# Patient Record
Sex: MALE | Race: WHITE | NOT HISPANIC OR LATINO | Employment: STUDENT | URBAN - METROPOLITAN AREA
[De-identification: names, ages, dates, MRNs, and addresses within clinical notes are randomized per-mention and may not be internally consistent; named-entity substitution may affect disease eponyms.]

---
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
## 2023-09-30 ENCOUNTER — APPOINTMENT (OUTPATIENT)
Dept: RADIOLOGY | Facility: CLINIC | Age: 23
End: 2023-09-30
Payer: COMMERCIAL

## 2023-09-30 ENCOUNTER — HOSPITAL ENCOUNTER (EMERGENCY)
Facility: CLINIC | Age: 23
Discharge: HOME | End: 2023-09-30
Payer: COMMERCIAL

## 2023-09-30 VITALS
HEIGHT: 71 IN | RESPIRATION RATE: 16 BRPM | TEMPERATURE: 99.2 F | BODY MASS INDEX: 29.4 KG/M2 | WEIGHT: 210 LBS | DIASTOLIC BLOOD PRESSURE: 76 MMHG | OXYGEN SATURATION: 98 % | SYSTOLIC BLOOD PRESSURE: 119 MMHG | HEART RATE: 80 BPM

## 2023-09-30 DIAGNOSIS — S90.129A CONTUSION OF TOE: ICD-10-CM

## 2023-09-30 DIAGNOSIS — S99.921A INJURY OF TOE ON RIGHT FOOT, INITIAL ENCOUNTER: ICD-10-CM

## 2023-09-30 DIAGNOSIS — M79.674 PAIN IN TOE OF RIGHT FOOT: ICD-10-CM

## 2023-09-30 PROCEDURE — 73630 X-RAY EXAM OF FOOT: CPT | Mod: RT

## 2023-09-30 PROCEDURE — 99284 EMERGENCY DEPT VISIT MOD MDM: CPT | Mod: 25

## 2023-09-30 ASSESSMENT — ACTIVITIES OF DAILY LIVING (ADL): ADLS_ACUITY_SCORE: 35

## 2023-10-01 NOTE — ED PROVIDER NOTES
EMERGENCY DEPARTMENT ENCOUNTER      NAME: Rashad Osuna  AGE: 22 year old male  YOB: 2000  MRN: [id number]  EVALUATION DATE & TIME: 9/30/2023  8:30 PM    PCP: Rhonda Stern    ED PROVIDER: Francesca Stapleton PA-C    Chief Complaint   Patient presents with    Toe Injury     FINAL IMPRESSION:  1. Pain in toe of right foot    2. Injury of toe on right foot, initial encounter    3. Contusion of toe      MEDICAL DECISION MAKING:    Pertinent Labs & Imaging studies reviewed. (See chart for details)  Rashad Osuna is a 22 year old male who presents for evaluation of right toe pain. Patient reports he accidentally twisted his right 5th toe during karate earlier today around 4pm. Started having worsening pain around 7pm this evening after walking on it for a few hours. Describes pain as throbbing, worse with ambulation. No paresthesias. He denies falling or any other injuries. He has not iced his toe or tried any pain medications prior to arrival. Denies any other concerns..     On my initial evaluation, vital signs normal. On physical exam patient is awake, alert, no acute distress, resting comfortably in bed.  Does not appear uncomfortable, ill or toxic.  On inspection of his right foot, he does have ecchymosis and mild swelling to his right fifth toe dorsally.  No abrasion, laceration or skin wound.  Mild tenderness to palpation over this area, but no significant bony tenderness.  Full range of motion of toe.  Able to wiggle all toes appropriately.  No other bony tenderness on palpation to tarsal and metatarsals.  Patient able to bear weight with an antalgic gait.  Normal strength and sensation to right foot..    Differential diagnosis includes muscle strain, sprain, contusion, hematoma, fracture, dislocation, tendon or ligamentous injury,. Emergency department workup included x-ray of right toes. Patient declined wanting medication for pain.    X-ray negative for fracture or dislocation.  Suspect  this is a toe sprain versus bony contusion.  Discussed RICE with patient and offered su taping and Ortho shoe, for which patient accepted both of these.  Encouraged patient to take Tylenol and ibuprofen for the pain.  Otherwise reassuring exam and work-up today, low suspicion for any emergent process that require further ER intervention or hospital admission.  Provided expectant management as well as strict return precautions.    Patient has had serial examinations and notes significant improvement.     Patient was discharged in stable condition with treatment plan as below. Instructed to follow up with primary care provider in 3 days and return to the emergency department with any new or worsening of symptoms. Patient expressed understanding, feels comfortable, and is in agreement with this plan. All questions addressed prior to discharge.    Medical Decision Making    History:  Supplemental history from: Documented in chart, if applicable  External Record(s) reviewed: Documented in chart, if applicable.    Work Up:  Chart documentation includes differential considered and any EKGs or imaging independently interpreted by provider, where specified.  In additional to work up documented, I considered the following work up: Documented in chart, if applicable.    External consultation:  Discussion of management with another provider: Documented in chart, if applicable    Complicating factors:  Care impacted by chronic illness: N/A  Care affected by social determinants of health: N/A    Disposition considerations: Discharge. No recommendations on prescription strength medication(s). See documentation for any additional details.    ED COURSE:  8:38 PM  I reviewed the patient's chart. I met with the patient to gather history and to perform my initial exam. Updated patient and parents on results. We discussed plan for discharge including treatment plan, follow-up and return precautions to emergency department.  Patient  voiced understanding and in agreement with this plan.    At the conclusion of the encounter I discussed the results of all of the tests and the disposition. The questions were answered. The patient or family acknowledged understanding and was agreeable with the care plan.     Voice recognition software was used in the creation of this note. Any grammatical or nonsensical errors are due to inherent errors with the software and are not the intention of the writer.     MEDICATIONS GIVEN IN THE EMERGENCY:  Medications - No data to display    NEW PRESCRIPTIONS STARTED AT TODAY'S ER VISIT  Discharge Medication List as of 9/30/2023  9:37 PM        =================================================================    HPI:    Patient information was obtained from: patient    Use of Interpretor: N/A    Rashad MARLA Osuna is a 22 year old male without a pertinent history who presents to this ED by walk in for evaluation of right 5th toe pain. Patient reports he accidentally twisted his right 5th toe during karate earlier today around 4pm. Started having worsening pain around 7pm this evening after walking on it for a few hours. Describes pain as throbbing, worse with ambulation. No paresthesias. He denies falling or any other injuries. He has not iced his toe or tried any pain medications prior to arrival. Denies any other concerns.    REVIEW OF SYSTEMS:  Review of Systems   Musculoskeletal:         +R 5th toe pain     PAST MEDICAL HISTORY:  History reviewed. No pertinent past medical history.    PAST SURGICAL HISTORY:  History reviewed. No pertinent surgical history.    CURRENT MEDICATIONS:    No current facility-administered medications for this encounter.    Current Outpatient Medications:     guanFACINE (TENEX) 1 MG tablet, [GUANFACINE (TENEX) 1 MG TABLET] Take 0.5 mg by mouth 2 (two) times a day., Disp: , Rfl:     inulin (FIBER GUMMIES ORAL), [INULIN (FIBER GUMMIES ORAL)] Take 1 tablet by mouth daily., Disp: , Rfl:      "melatonin 5 mg Chew, [MELATONIN 5 MG CHEW] Chew 5 mg at bedtime as needed., Disp: , Rfl:     multivitamin Chew, [MULTIVITAMIN CHEW] Chew 1 tablet at bedtime., Disp: , Rfl:     UNABLE TO FIND, [UNABLE TO FIND] Take 1 tablet by mouth daily. Med Name: allergy medication - pt does not know the name of it, Disp: , Rfl:     ALLERGIES:  Allergies   Allergen Reactions    Methylphenidate Hcl [Methylphenidate] Hives       FAMILY HISTORY:  History reviewed. No pertinent family history.    SOCIAL HISTORY:   Social History     Socioeconomic History    Marital status: Single       VITALS:  Patient Vitals for the past 24 hrs:   BP Temp Temp src Pulse Resp SpO2 Height Weight   09/30/23 2031 119/76 99.2  F (37.3  C) Oral 80 16 98 % 1.803 m (5' 11\") 95.3 kg (210 lb)       PHYSICAL EXAM    Constitutional: Well developed, Well nourished, NAD  HENT: Normocephalic, Atraumatic, Bilateral external ears normal, Oropharynx normal, mucous membranes moist, Nose normal.   Neck: Normal range of motion, No tenderness, Supple, No stridor.  Eyes: PERRL, EOMI, Conjunctiva normal, No discharge.   Musculoskeletal/integument: On inspection of his right foot, he does have ecchymosis and mild swelling to his right fifth toe dorsally.  No abrasion, laceration or skin wound.  Mild tenderness to palpation over this area, but no significant bony tenderness.  Full range of motion of toe.  Able to wiggle all toes appropriately.  No other bony tenderness on palpation to tarsal and metatarsals.  Patient able to bear weight with an antalgic gait.  Normal strength and sensation to right foot..2+ DP pulses. . No cyanosis, No clubbing. Good range of motion in all major joints. No tenderness to palpation or major deformities noted. No tenderness of the CTLS spine. .  Neurologic: Alert & oriented x 3, Normal motor function, Normal sensory function, No focal deficits noted.   Psychiatric: Affect normal, Judgment normal, Mood normal. Cooperative.    LAB:  All pertinent " labs reviewed and interpreted.  Labs Ordered and Resulted from Time of ED Arrival to Time of ED Departure - No data to display    RADIOLOGY:  Reviewed all pertinent imaging. Please see official radiology report.  Foot  XR, G/E 3 views, right   Final Result   IMPRESSION: Normal joint spaces and alignment. No fracture.        EKG:    None.    PROCEDURES:   None.    Diagnosis:  1. Pain in toe of right foot    2. Injury of toe on right foot, initial encounter    3. Contusion of toe      I, Jeffy Gaming, am serving as a scribe to document services personally performed by Francesca Stapleton PA-C based on my observation and the provider's statements to me. I, Francesca Stapleton PA-C attest that Jeffy Gaming is acting in a scribe capacity, has observed my performance of the services and has documented them in accordance with my direction.    Francesca Stapleton PA-C  Emergency Medicine  [facility]  9/30/2023       Francesca Stapleton PA-C  09/30/23 7729

## 2024-03-14 ENCOUNTER — HOSPITAL ENCOUNTER (EMERGENCY)
Facility: CLINIC | Age: 24
Discharge: HOME | End: 2024-03-14
Attending: EMERGENCY MEDICINE | Admitting: EMERGENCY MEDICINE
Payer: COMMERCIAL

## 2024-03-14 VITALS
BODY MASS INDEX: 29.4 KG/M2 | SYSTOLIC BLOOD PRESSURE: 128 MMHG | DIASTOLIC BLOOD PRESSURE: 72 MMHG | OXYGEN SATURATION: 99 % | HEART RATE: 61 BPM | TEMPERATURE: 98.5 F | RESPIRATION RATE: 18 BRPM | HEIGHT: 71 IN | WEIGHT: 210 LBS

## 2024-03-14 DIAGNOSIS — K52.9 GASTROENTERITIS: ICD-10-CM

## 2024-03-14 LAB
ALBUMIN SERPL BCG-MCNC: 5 G/DL (ref 3.5–5.2)
ALP SERPL-CCNC: 72 U/L (ref 40–150)
ALT SERPL W P-5'-P-CCNC: 38 U/L (ref 0–70)
ANION GAP SERPL CALCULATED.3IONS-SCNC: 16 MMOL/L (ref 7–15)
AST SERPL W P-5'-P-CCNC: 32 U/L (ref 0–45)
BASOPHILS # BLD AUTO: 0 10E3/UL (ref 0–0.2)
BASOPHILS NFR BLD AUTO: 0 %
BILIRUB SERPL-MCNC: 0.9 MG/DL
BUN SERPL-MCNC: 16.2 MG/DL (ref 6–20)
CALCIUM SERPL-MCNC: 9.8 MG/DL (ref 8.6–10)
CHLORIDE SERPL-SCNC: 103 MMOL/L (ref 98–107)
CREAT SERPL-MCNC: 0.76 MG/DL (ref 0.67–1.17)
DEPRECATED HCO3 PLAS-SCNC: 19 MMOL/L (ref 22–29)
EGFRCR SERPLBLD CKD-EPI 2021: >90 ML/MIN/1.73M2
EOSINOPHIL # BLD AUTO: 0.1 10E3/UL (ref 0–0.7)
EOSINOPHIL NFR BLD AUTO: 1 %
ERYTHROCYTE [DISTWIDTH] IN BLOOD BY AUTOMATED COUNT: 11.9 % (ref 10–15)
GLUCOSE SERPL-MCNC: 156 MG/DL (ref 70–99)
HCT VFR BLD AUTO: 46.9 % (ref 40–53)
HGB BLD-MCNC: 17 G/DL (ref 13.3–17.7)
IMM GRANULOCYTES # BLD: 0.1 10E3/UL
IMM GRANULOCYTES NFR BLD: 0 %
LIPASE SERPL-CCNC: 15 U/L (ref 13–60)
LYMPHOCYTES # BLD AUTO: 1.1 10E3/UL (ref 0.8–5.3)
LYMPHOCYTES NFR BLD AUTO: 6 %
MCH RBC QN AUTO: 30.3 PG (ref 26.5–33)
MCHC RBC AUTO-ENTMCNC: 36.2 G/DL (ref 31.5–36.5)
MCV RBC AUTO: 84 FL (ref 78–100)
MONOCYTES # BLD AUTO: 0.9 10E3/UL (ref 0–1.3)
MONOCYTES NFR BLD AUTO: 5 %
NEUTROPHILS # BLD AUTO: 17 10E3/UL (ref 1.6–8.3)
NEUTROPHILS NFR BLD AUTO: 89 %
NRBC # BLD AUTO: 0 10E3/UL
NRBC BLD AUTO-RTO: 0 /100
PLATELET # BLD AUTO: 279 10E3/UL (ref 150–450)
POTASSIUM SERPL-SCNC: 3.8 MMOL/L (ref 3.4–5.3)
PROT SERPL-MCNC: 7.8 G/DL (ref 6.4–8.3)
RBC # BLD AUTO: 5.61 10E6/UL (ref 4.4–5.9)
SODIUM SERPL-SCNC: 138 MMOL/L (ref 135–145)
WBC # BLD AUTO: 19.1 10E3/UL (ref 4–11)

## 2024-03-14 PROCEDURE — 96361 HYDRATE IV INFUSION ADD-ON: CPT

## 2024-03-14 PROCEDURE — 250N000013 HC RX MED GY IP 250 OP 250 PS 637: Performed by: EMERGENCY MEDICINE

## 2024-03-14 PROCEDURE — 250N000011 HC RX IP 250 OP 636: Performed by: EMERGENCY MEDICINE

## 2024-03-14 PROCEDURE — 85041 AUTOMATED RBC COUNT: CPT | Performed by: EMERGENCY MEDICINE

## 2024-03-14 PROCEDURE — 83690 ASSAY OF LIPASE: CPT | Performed by: EMERGENCY MEDICINE

## 2024-03-14 PROCEDURE — 36415 COLL VENOUS BLD VENIPUNCTURE: CPT | Performed by: EMERGENCY MEDICINE

## 2024-03-14 PROCEDURE — 99284 EMERGENCY DEPT VISIT MOD MDM: CPT | Mod: 25

## 2024-03-14 PROCEDURE — 96374 THER/PROPH/DIAG INJ IV PUSH: CPT

## 2024-03-14 PROCEDURE — 258N000003 HC RX IP 258 OP 636: Performed by: EMERGENCY MEDICINE

## 2024-03-14 PROCEDURE — 80053 COMPREHEN METABOLIC PANEL: CPT | Performed by: EMERGENCY MEDICINE

## 2024-03-14 PROCEDURE — C9113 INJ PANTOPRAZOLE SODIUM, VIA: HCPCS | Performed by: EMERGENCY MEDICINE

## 2024-03-14 PROCEDURE — 96375 TX/PRO/DX INJ NEW DRUG ADDON: CPT

## 2024-03-14 RX ORDER — DICYCLOMINE HYDROCHLORIDE 10 MG/1
20 CAPSULE ORAL ONCE
Status: COMPLETED | OUTPATIENT
Start: 2024-03-14 | End: 2024-03-14

## 2024-03-14 RX ORDER — DICYCLOMINE HCL 20 MG
20 TABLET ORAL 4 TIMES DAILY PRN
Qty: 20 TABLET | Refills: 0 | Status: SHIPPED | OUTPATIENT
Start: 2024-03-14 | End: 2024-03-24

## 2024-03-14 RX ORDER — LOPERAMIDE HCL 2 MG
4 CAPSULE ORAL ONCE
Status: COMPLETED | OUTPATIENT
Start: 2024-03-14 | End: 2024-03-14

## 2024-03-14 RX ORDER — HYOSCYAMINE SULFATE 0.38 MG/1
375 TABLET, EXTENDED RELEASE ORAL ONCE
Status: COMPLETED | OUTPATIENT
Start: 2024-03-14 | End: 2024-03-14

## 2024-03-14 RX ORDER — LOPERAMIDE HYDROCHLORIDE 2 MG/1
2 TABLET ORAL 4 TIMES DAILY PRN
Qty: 20 TABLET | Refills: 0 | Status: SHIPPED | OUTPATIENT
Start: 2024-03-14

## 2024-03-14 RX ORDER — ONDANSETRON 8 MG/1
8 TABLET, ORALLY DISINTEGRATING ORAL EVERY 8 HOURS PRN
Qty: 12 TABLET | Refills: 0 | Status: SHIPPED | OUTPATIENT
Start: 2024-03-14

## 2024-03-14 RX ORDER — ONDANSETRON 2 MG/ML
4 INJECTION INTRAMUSCULAR; INTRAVENOUS ONCE
Status: COMPLETED | OUTPATIENT
Start: 2024-03-14 | End: 2024-03-14

## 2024-03-14 RX ADMIN — DICYCLOMINE HYDROCHLORIDE 20 MG: 10 CAPSULE ORAL at 04:24

## 2024-03-14 RX ADMIN — LOPERAMIDE HYDROCHLORIDE 4 MG: 2 CAPSULE ORAL at 05:14

## 2024-03-14 RX ADMIN — PANTOPRAZOLE SODIUM 40 MG: 40 INJECTION, POWDER, FOR SOLUTION INTRAVENOUS at 04:21

## 2024-03-14 RX ADMIN — ONDANSETRON 4 MG: 2 INJECTION INTRAMUSCULAR; INTRAVENOUS at 04:20

## 2024-03-14 RX ADMIN — SODIUM CHLORIDE 1000 ML: 9 INJECTION, SOLUTION INTRAVENOUS at 04:20

## 2024-03-14 RX ADMIN — HYOSCYAMINE SULFATE EXTENDED-RELEASE 375 MCG: 0.38 TABLET ORAL at 04:44

## 2024-03-14 RX ADMIN — SODIUM CHLORIDE, POTASSIUM CHLORIDE, SODIUM LACTATE AND CALCIUM CHLORIDE 1000 ML: 600; 310; 30; 20 INJECTION, SOLUTION INTRAVENOUS at 05:29

## 2024-03-14 ASSESSMENT — ENCOUNTER SYMPTOMS
ABDOMINAL PAIN: 1
NAUSEA: 1
VOMITING: 1
DIARRHEA: 1
BLOOD IN STOOL: 0

## 2024-03-14 ASSESSMENT — ACTIVITIES OF DAILY LIVING (ADL)
ADLS_ACUITY_SCORE: 35

## 2024-03-14 NOTE — ED PROVIDER NOTES
EMERGENCY DEPARTMENT ENCOUNTER      NAME: Rashad Osuna  AGE: 23 year old male  YOB: 2000  MRN: [id number]  EVALUATION DATE & TIME: 3/14/2024  3:52 AM    PCP: Bety Mata    ED PROVIDER: Opal Bal M.D.      Chief Complaint   Patient presents with    Nausea, Vomiting, & Diarrhea         FINAL IMPRESSION:  1. Gastroenteritis        MEDICAL DECISION MAKING:    Pertinent Labs & Imaging studies reviewed. (See chart for details)  ED Course as of 03/14/24 0553   Thu Mar 14, 2024   0409 Afebrile vital signs are unremarkable.  Patient is coming in with nausea vomiting and diarrhea.  Started about 1:00 last night.  Not feeling well earlier in the day.  Vomiting, last episode did have some streaks of blood in it.  Tried to take omeprazole but vomited that up.  He is having diarrhea.  No melena or hematochezia.  No fevers.  Has had chills throughout the day.    Exam for patient here appears mildly anxious.  Mildly diaphoretic.  Has tenderness palpation across the entirety of his upper abdomen.  No significant lower abdominal tenderness.  Remainder unremarkable.    Check some labs for patient here.  Will give a liter of fluid.  Tried Zofran, Protonix.  Will try hyoscyamine and Bentyl as well.   0457 Rechecked patient, he is feeling better after medications.  Still having some diarrhea but cramping is better, pain in his upper abdomen is better.  Able to tolerate p.o.  No longer feeling nauseated.  Heart rate down to the 60s.  Labs did come back with some likely hypochloremic metabolic acidosis likely from vomiting.  He is getting fluids at this time.  White blood cell count here is elevated at 19.1.  This could be acute phase from his vomiting.  Overall here he looks well.  His vital signs of normalized.   0539 Patient does continue with some mild diarrhea but otherwise overall is feeling improved.  Repeat abdominal exam is benign.  I do not feel he requires any urgent imaging at this time.  He is  able to tolerate p.o.  Sent home with multiple medications for symptom management.  Will follow-up with his primary care doctor.  Return for any new or worsening symptoms         Medical Decision Making  Obtained supplemental history:Supplemental history obtained?: Documented in chart  Reviewed external records: External records reviewed?: Documented in chart  Care impacted by chronic illness:N/A  Care significantly affected by social determinants of health:Access to Medical Care  Did you consider but not order tests?: Work up considered but not performed and documented in chart, if applicable  Did you interpret images independently?: Independent interpretation of ECG and images noted in documentation, when applicable.  Consultation discussion with other provider:Did you involve another provider (consultant, , pharmacy, etc.)?: No  Discharge. I prescribed additional prescription strength medication(s) as charted. See documentation for any additional details.      Critical care: 0 minutes excluding separately billable procedures.  Includes bedside management, time reviewing test results, review of records, discussing the case with staff, documenting the medical record and time spent with family members (or surrogate decision makers) discussing specific treatment issues.          ED COURSE:  4:12 AM I met with the patient, obtained history, performed an initial exam, and discussed options and plan for diagnostics and treatment here in the ED.   4:52 AM I checked on the patient. He reports feeling better.    The importance of close follow up was discussed. We reviewed warning signs and symptoms, and I instructed Mr. Osuna to return to the emergency department immediately if he develops any new or worsening symptoms. I provided additional verbal discharge instructions. Mr. Osuna expressed understanding and agreement with this plan of care, his questions were answered, and he was discharged in stable condition.      MEDICATIONS GIVEN IN THE EMERGENCY:  Medications   sodium chloride 0.9% BOLUS 1,000 mL (0 mLs Intravenous Stopped 3/14/24 0529)   ondansetron (ZOFRAN) injection 4 mg (4 mg Intravenous $Given 3/14/24 0420)   pantoprazole (PROTONIX) IV push injection 40 mg (40 mg Intravenous $Given 3/14/24 0421)   hyoscyamine ER (LEVBID) 12 hr tablet 375 mcg (375 mcg Oral $Given 3/14/24 0444)   dicyclomine (BENTYL) capsule 20 mg (20 mg Oral $Given 3/14/24 0424)   loperamide (IMODIUM) capsule 4 mg (4 mg Oral $Given 3/14/24 0514)   lactated ringers BOLUS 1,000 mL (1,000 mLs Intravenous $New Bag 3/14/24 0529)       NEW PRESCRIPTIONS STARTED AT TODAY'S ER VISIT:  New Prescriptions    DICYCLOMINE (BENTYL) 20 MG TABLET    Take 1 tablet (20 mg) by mouth 4 times daily as needed (diarrhea, abdominal cramping)    HYOSCYAMINE (LEVSIN/SL) 0.125 MG SUBLINGUAL TABLET    Place 1 tablet (0.125 mg) under the tongue every 4 hours as needed for cramping or diarrhea    LOPERAMIDE (IMODIUM A-D) 2 MG TABLET    Take 1 tablet (2 mg) by mouth 4 times daily as needed for diarrhea    ONDANSETRON (ZOFRAN ODT) 8 MG ODT TAB    Take 1 tablet (8 mg) by mouth every 8 hours as needed for nausea          =================================================================    HPI    Patient information was obtained from: The patient    Use of : N/A         Rashad MARLA Osuna is a 23 year old male who presents with nausea and vomiting around 11:30 PM last night and has been constant since. He endorses diarrhea. No blood in stools or vomit. He has been experiencing chills for the past 4 hours. He associates epigastric pain due to vomiting. He tried taking Omeprazole but could not keep it down. No other medical complaints or concerns at this time.      REVIEW OF SYSTEMS   Review of Systems   Gastrointestinal:  Positive for abdominal pain, diarrhea, nausea and vomiting. Negative for blood in stool.   All other systems reviewed and are negative.        PAST MEDICAL  "HISTORY:  History reviewed. No pertinent past medical history.    PAST SURGICAL HISTORY:  History reviewed. No pertinent surgical history.    CURRENT MEDICATIONS:    No current facility-administered medications for this encounter.    Current Outpatient Medications:     dicyclomine (BENTYL) 20 MG tablet, Take 1 tablet (20 mg) by mouth 4 times daily as needed (diarrhea, abdominal cramping), Disp: 20 tablet, Rfl: 0    hyoscyamine (LEVSIN/SL) 0.125 MG sublingual tablet, Place 1 tablet (0.125 mg) under the tongue every 4 hours as needed for cramping or diarrhea, Disp: 20 tablet, Rfl: 0    loperamide (IMODIUM A-D) 2 MG tablet, Take 1 tablet (2 mg) by mouth 4 times daily as needed for diarrhea, Disp: 20 tablet, Rfl: 0    ondansetron (ZOFRAN ODT) 8 MG ODT tab, Take 1 tablet (8 mg) by mouth every 8 hours as needed for nausea, Disp: 12 tablet, Rfl: 0    guanFACINE (TENEX) 1 MG tablet, [GUANFACINE (TENEX) 1 MG TABLET] Take 0.5 mg by mouth 2 (two) times a day., Disp: , Rfl:     inulin (FIBER GUMMIES ORAL), [INULIN (FIBER GUMMIES ORAL)] Take 1 tablet by mouth daily., Disp: , Rfl:     melatonin 5 mg Chew, [MELATONIN 5 MG CHEW] Chew 5 mg at bedtime as needed., Disp: , Rfl:     multivitamin Chew, [MULTIVITAMIN CHEW] Chew 1 tablet at bedtime., Disp: , Rfl:     UNABLE TO FIND, [UNABLE TO FIND] Take 1 tablet by mouth daily. Med Name: allergy medication - pt does not know the name of it, Disp: , Rfl:     ALLERGIES:  Allergies   Allergen Reactions    Methylphenidate Hcl [Methylphenidate] Hives       FAMILY HISTORY:  No family history on file.    SOCIAL HISTORY:   Social History     Socioeconomic History    Marital status: Single       PHYSICAL EXAM:    Vitals: /63   Pulse 63   Temp 98.5  F (36.9  C) (Oral)   Resp 20   Ht 1.803 m (5' 11\")   Wt 95.3 kg (210 lb)   SpO2 100%   BMI 29.29 kg/m     General:. Alert and interactive, mildly anxious, mildly diaphoretic.   HENT: Oropharynx without erythema or exudates. MMM.  TMs clear " bilaterally.  Eyes: Pupils mid-sized and equally reactive.   Neck: Full AROM. Nontender  Cardiovascular: Regular rate and rhythm. Peripheral pulses 2+ bilaterally.  Chest/Pulmonary: Normal work of breathing. Lung sounds clear and equal throughout, no wheezes or crackles. No chest wall tenderness or deformities.  Abdomen: Soft, nondistended. Tenderness to palpation across the entirety of his upper abdomen. No significant lower abdominal tenderness.  Back/Spine: No CVA or midline tenderness.  Extremities: Normal ROM of all major joints. No lower extremity edema.   Skin: Warm and dry. Normal skin color.   Neuro: Speech clear. CNs grossly intact. Moves all extremities appropriately. Strength and sensation grossly intact to all extremities.   Psych: Normal affect/mood, cooperative, memory appropriate.     LAB:  All pertinent labs reviewed and interpreted.  Labs Ordered and Resulted from Time of ED Arrival to Time of ED Departure   COMPREHENSIVE METABOLIC PANEL - Abnormal       Result Value    Sodium 138      Potassium 3.8      Carbon Dioxide (CO2) 19 (*)     Anion Gap 16 (*)     Urea Nitrogen 16.2      Creatinine 0.76      GFR Estimate >90      Calcium 9.8      Chloride 103      Glucose 156 (*)     Alkaline Phosphatase 72      AST 32      ALT 38      Protein Total 7.8      Albumin 5.0      Bilirubin Total 0.9     CBC WITH PLATELETS AND DIFFERENTIAL - Abnormal    WBC Count 19.1 (*)     RBC Count 5.61      Hemoglobin 17.0      Hematocrit 46.9      MCV 84      MCH 30.3      MCHC 36.2      RDW 11.9      Platelet Count 279      % Neutrophils 89      % Lymphocytes 6      % Monocytes 5      % Eosinophils 1      % Basophils 0      % Immature Granulocytes 0      NRBCs per 100 WBC 0      Absolute Neutrophils 17.0 (*)     Absolute Lymphocytes 1.1      Absolute Monocytes 0.9      Absolute Eosinophils 0.1      Absolute Basophils 0.0      Absolute Immature Granulocytes 0.1      Absolute NRBCs 0.0     LIPASE - Normal    Lipase 15          RADIOLOGY:  No orders to display       EKG:    None      PROCEDURES:   None      I, Ismael Hansen, am serving as a scribe to document services personally performed by Dr. Opal Bal  based on my observation and the provider's statements to me. I, Opal Bal MD attest that Ismael Hansen is acting in a scribe capacity, has observed my performance of the services and has documented them in accordance with my direction.      Opal Bal M.D.  Emergency Medicine  [facility] EMERGENCY ROOM  [address] [place] [id number]  [phone]  Dept: [phone]     Opal Bal MD  03/14/24 0517

## 2024-03-14 NOTE — DISCHARGE INSTRUCTIONS
Please take medications prescribed for symptom control.  Follow-up with your primary care doctor.  Return for any new or worsening symptoms.

## 2024-03-14 NOTE — ED NOTES
AVS reviewed with pt and mom. Education provided on worsening symptoms to watch out for, new prescriptions, and close follow-up with PCP. All questions were answered. Vitally stable upon discharge.

## 2024-03-14 NOTE — ED TRIAGE NOTES
Pt presents with N/V/D that began around 2300 last night. Pt was not feeling good earlier in the day and has progressively gotten worse. Attempted to take omeprazole but was unable to keep down. Pt vomiting in triage. States there have been specs of blood in most recent emesis episode. Mother presents at bedside. Hx of anxiety and autism.